# Patient Record
Sex: FEMALE | Race: WHITE | NOT HISPANIC OR LATINO | Employment: FULL TIME | URBAN - METROPOLITAN AREA
[De-identification: names, ages, dates, MRNs, and addresses within clinical notes are randomized per-mention and may not be internally consistent; named-entity substitution may affect disease eponyms.]

---

## 2017-02-24 DIAGNOSIS — N94.6 DYSMENORRHEA: ICD-10-CM

## 2017-02-24 NOTE — LETTER
[facility]                                             [address] [place]  [address]    February 24, 2017    Jil Renteria  [address] [facility] [place] [address]    Dear Jil,       We recently received a refill request for norgestrel-ethinyl estradiol (LO/OVRAL) 0.3-30 MG-MCG per tablet.  We have refilled this for one time only because you are due for a:    Physical office visit      Please call at your earliest convenience so that there will not be a delay with your future refills.          Thank you,   Your [facility] Care Team/  [phone]

## 2017-02-24 NOTE — TELEPHONE ENCOUNTER
Rx refilled per [facility] RN refill protocol.    TC, patient due for:  AFE and   Health Maintenance Due   Topic Date Due     EYE EXAM Q1 YEAR( NO INBASKET)  11/08/2012     HPV IMMUNIZATION (2 of 3 - Female 3 Dose Series) 09/23/2013     INFLUENZA VACCINE (SYSTEM ASSIGNED)  09/01/2016     Jo Ann Henao RN

## 2017-09-02 DIAGNOSIS — N94.6 DYSMENORRHEA: ICD-10-CM

## 2017-09-02 NOTE — LETTER
September 5, 2017    Jil Renteria  [address] [address] [facility] [address]        Dear Jil,       We recently received a refill request for LOW-OGESTREL 0.3-30 MG-MCG per tablet.  We have refilled this for a one time 28 day supply only because you are overdue for a:    AFE (annual female exam) office visit      Please call at your earliest convenience so that there will not be a delay with your future refills.          Thank you,   Your [facility] Team/hitesh  [phone]

## 2017-09-05 RX ORDER — NORGESTREL AND ETHINYL ESTRADIOL 0.3-0.03MG
KIT ORAL
Qty: 28 TABLET | Refills: 0 | Status: SHIPPED | OUTPATIENT
Start: 2017-09-05 | End: 2017-09-15

## 2017-09-05 NOTE — TELEPHONE ENCOUNTER
Patient overdue for AFE.  Letter mailed to patient in Feb.     No appointment pending at this time.  Routing to provider to advise.    Jo Ann Henao RN

## 2017-09-15 ENCOUNTER — OFFICE VISIT (OUTPATIENT)
Dept: FAMILY MEDICINE | Facility: CLINIC | Age: 22
End: 2017-09-15
Payer: COMMERCIAL

## 2017-09-15 VITALS
DIASTOLIC BLOOD PRESSURE: 77 MMHG | TEMPERATURE: 96.9 F | WEIGHT: 106 LBS | HEIGHT: 62 IN | BODY MASS INDEX: 19.51 KG/M2 | OXYGEN SATURATION: 100 % | SYSTOLIC BLOOD PRESSURE: 120 MMHG | HEART RATE: 72 BPM

## 2017-09-15 DIAGNOSIS — N94.6 DYSMENORRHEA: ICD-10-CM

## 2017-09-15 DIAGNOSIS — Z23 NEED FOR PROPHYLACTIC VACCINATION AND INOCULATION AGAINST INFLUENZA: ICD-10-CM

## 2017-09-15 DIAGNOSIS — Z00.00 ENCOUNTER FOR ROUTINE ADULT HEALTH EXAMINATION WITHOUT ABNORMAL FINDINGS: Primary | ICD-10-CM

## 2017-09-15 DIAGNOSIS — Z11.3 SCREENING EXAMINATION FOR VENEREAL DISEASE: ICD-10-CM

## 2017-09-15 PROCEDURE — 99395 PREV VISIT EST AGE 18-39: CPT | Mod: 25 | Performed by: PHYSICIAN ASSISTANT

## 2017-09-15 PROCEDURE — 90471 IMMUNIZATION ADMIN: CPT | Performed by: PHYSICIAN ASSISTANT

## 2017-09-15 PROCEDURE — 90686 IIV4 VACC NO PRSV 0.5 ML IM: CPT | Performed by: PHYSICIAN ASSISTANT

## 2017-09-15 NOTE — NURSING NOTE
"Chief Complaint   Patient presents with     Physical       Initial /77  Pulse 72  Temp 96.9  F (36.1  C) (Oral)  Ht 5' 1.5\" (1.562 m)  Wt 106 lb (48.1 kg)  LMP 09/10/2017  SpO2 100%  BMI 19.7 kg/m2 Estimated body mass index is 19.7 kg/(m^2) as calculated from the following:    Height as of this encounter: 5' 1.5\" (1.562 m).    Weight as of this encounter: 106 lb (48.1 kg).  Medication Reconciliation: complete     Carmelita Gil cma      "

## 2017-09-15 NOTE — PROGRESS NOTES
Injectable Influenza Immunization Documentation    1.  Is the person to be vaccinated sick today? no    2. Does the person to be vaccinated have an allergy to a component   of the vaccine? no    3. Has the person to be vaccinated ever had a serious reaction   to influenza vaccine in the past? no    4. Has the person to be vaccinated ever had Guillain-Barré syndrome?     Form completed by Carmelita Gil [facility]

## 2017-09-15 NOTE — MR AVS SNAPSHOT
After Visit Summary   9/15/2017    Jil Renteria    MRN: [id number]           Patient Information     Date Of Birth          1995        Visit Information        Provider Department      9/15/2017 2:00 PM Essence Rosario PA-C [facility]        Today's Diagnoses     Screening examination for venereal disease    -  1    Dysmenorrhea          Care Instructions      Preventive Health Recommendations  Female Ages 18 to 25     Yearly exam:     See your health care provider every year in order to  o Review health changes.   o Discuss preventive care.    o Review your medicines if your doctor has prescribed any.      You should be tested each year for STDs (sexually transmitted diseases).       After age 20, talk to your provider about how often you should have cholesterol testing.      Starting at age 21, get a Pap test every three years. If you have an abnormal result, your doctor may have you test more often.      If you are at risk for diabetes, you should have a diabetes test (fasting glucose).     Shots:     Get a flu shot each year.     Get a tetanus shot every 10 years.     Consider getting the shot (vaccine) that prevents cervical cancer (Gardasil).    Nutrition:     Eat at least 5 servings of fruits and vegetables each day.    Eat whole-grain bread, whole-wheat pasta and brown rice instead of white grains and rice.    Talk to your provider about Calcium and Vitamin D.     Lifestyle    Exercise at least 150 minutes a week each week (30 minutes a day, 5 days a week). This will help you control your weight and prevent disease.    Limit alcohol to one drink per day.    No smoking.     Wear sunscreen to prevent skin cancer.    See your dentist every six months for an exam and cleaning.  Preventive Health Recommendations  Female Ages 18 to 25     Yearly exam:   See your health care provider every year in order to  Review health changes.   Discuss preventive care.    Review your  medicines if your doctor has prescribed any.    You should be tested each year for STDs (sexually transmitted diseases).     After age 20, talk to your provider about how often you should have cholesterol testing.    Starting at age 21, get a Pap test every three years. If you have an abnormal result, your doctor may have you test more often.    If you are at risk for diabetes, you should have a diabetes test (fasting glucose).     Shots:   Get a flu shot each year.   Get a tetanus shot every 10 years.   Consider getting the shot (vaccine) that prevents cervical cancer (Gardasil).    Nutrition:   Eat at least 5 servings of fruits and vegetables each day.  Eat whole-grain bread, whole-wheat pasta and brown rice instead of white grains and rice.  Talk to your provider about Calcium and Vitamin D.     Lifestyle  Exercise at least 150 minutes a week each week (30 minutes a day, 5 days a week). This will help you control your weight and prevent disease.  Limit alcohol to one drink per day.  No smoking.   Wear sunscreen to prevent skin cancer.  See your dentist every six months for an exam and cleaning.          Follow-ups after your visit        Future tests that were ordered for you today     Open Future Orders        Priority Expected Expires Ordered    Chlamydia trachomatis PCR Routine  9/15/2018 9/15/2017            Who to contact     If you have questions or need follow up information about today's clinic visit or your schedule please contact [facility] directly at [phone].  Normal or non-critical lab and imaging results will be communicated to you by MyChart, letter or phone within 4 business days after the clinic has received the results. If you do not hear from us within 7 days, please contact the clinic through MyChart or phone. If you have a critical or abnormal lab result, we will notify you by phone as soon as possible.  Submit refill requests through [employer] or call your pharmacy and they  "will forward the refill request to us. Please allow 3 business days for your refill to be completed.          Additional Information About Your Visit        [employer] Information     [employer] gives you secure access to your electronic health record. If you see a primary care provider, you can also send messages to your care team and make appointments. If you have questions, please call your primary care clinic.  If you do not have a primary care provider, please call [phone] and they will assist you.        Care EveryWhere ID     This is your Care EveryWhere ID. This could be used by other organizations to access your [place] medical records  [id number]        Your Vitals Were     Pulse Temperature Height Last Period Pulse Oximetry BMI (Body Mass Index)    72 96.9  F (36.1  C) (Oral) 5' 1.5\" (1.562 m) 09/10/2017 100% 19.7 kg/m2       Blood Pressure from Last 3 Encounters:   09/15/17 120/77   10/30/16 119/67   03/18/16 112/69    Weight from Last 3 Encounters:   09/15/17 106 lb (48.1 kg)   10/30/16 106 lb 12.8 oz (48.4 kg) (11 %)*   03/18/16 107 lb (48.5 kg) (12 %)*     * Growth percentiles are based on CDC 2-20 Years data.                 Today's Medication Changes          These changes are accurate as of: 9/15/17  2:17 PM.  If you have any questions, ask your nurse or doctor.               These medicines have changed or have updated prescriptions.        Dose/Directions    norgestrel-ethinyl estradiol 0.3-30 MG-MCG per tablet   Commonly known as:  LOW-OGESTREL   This may have changed:  See the new instructions.   Used for:  Dysmenorrhea   Changed by:  Essence Rosario PA-C        Dose:  1 tablet   Take 1 tablet by mouth daily   Quantity:  28 tablet   Refills:  11         Stop taking these medicines if you haven't already. Please contact your care team if you have questions.     clindamycin 1 % topical gel   Commonly known as:  CLINDAMAX   Stopped by:  Essence Rosario PA-C                Where " to get your medicines      These medications were sent to [facility] - [place], [place] - [address] AT [facility] [address] [facility]  [address] [address]     Phone:  [phone]     norgestrel-ethinyl estradiol 0.3-30 MG-MCG per tablet                Primary Care Provider Office Phone # Fax #    [facility] [phone] [address] [facility] [place] [address]        Equal Access to Services     [facility] Hospital[facility]ANIYA : Hadii aad ku hadasho Soomaali, waaxda luqadaha, qaybta kaalmada adeegyada, waxdinorah rendon . So [facility] [phone].    ATENCIÓN: Si habla español, tiene a connolly disposición servicios gratuitos de asistencia lingüística. [facility] [phone].    We comply with applicable federal civil rights laws and [place] laws. We do not discriminate on the basis of race, color, national origin, age, disability sex, sexual orientation or gender identity.            Thank you!     Thank you for choosing [facility]  for your care. Our goal is always to provide you with excellent care. Hearing back from our patients is one way we can continue to improve our services. Please take a few minutes to complete the written survey that you may receive in the mail after your visit with us. Thank you!             Your Updated Medication List - Protect others around you: Learn how to safely use, store and throw away your medicines at www.disposemymeds.org.          This list is accurate as of: 9/15/17  2:17 PM.  Always use your most recent med list.                   Brand Name Dispense Instructions for use Diagnosis    ALLEGRA PO           norgestrel-ethinyl estradiol 0.3-30 MG-MCG per tablet    LOW-OGESTREL    28 tablet    Take 1 tablet by mouth daily    Dysmenorrhea

## 2017-09-15 NOTE — PROGRESS NOTES
SUBJECTIVE:   CC: Jil Renteria is an 22 year old woman who presents for preventive health visit.     Healthy Habits:    Do you get at least three servings of calcium containing foods daily (dairy, green leafy vegetables, etc.)? yes    Amount of exercise or daily activities, outside of work: walking around campus    Problems taking medications regularly No    Medication side effects: No    Have you had an eye exam in the past two years? yes    Do you see a dentist twice per year? yes    Do you have sleep apnea, excessive snoring or daytime drowsiness?no      She is requesting a refill of her birth control.  She would like her flu shot today.      Today's PHQ-2 Score:   PHQ-2 ( 1999 Pfizer) 8/10/2015 3/30/2015   Q1: Little interest or pleasure in doing things 0 0   Q2: Feeling down, depressed or hopeless 0 0   PHQ-2 Score 0 0       Abuse: Current or Past(Physical, Sexual or Emotional)- No  Do you feel safe in your environment - Yes    Social History   Substance Use Topics     Smoking status: Never Smoker     Smokeless tobacco: Never Used      Comment: lives in smoke free household     Alcohol use No     The patient does not drink >3 drinks per day nor >7 drinks per week.    Reviewed orders with patient.  Reviewed health maintenance and updated orders accordingly - Yes  BP Readings from Last 3 Encounters:   09/15/17 120/77   10/30/16 119/67   03/18/16 112/69    Wt Readings from Last 3 Encounters:   09/15/17 106 lb (48.1 kg)   10/30/16 106 lb 12.8 oz (48.4 kg) (11 %)*   03/18/16 107 lb (48.5 kg) (12 %)*     * Growth percentiles are based on CDC 2-20 Years data.                  Patient Active Problem List   Diagnosis     Acne     Left hip pain     Acne vulgaris     Dysmenorrhea     Encounter for contraceptive management, unspecified encounter     History reviewed. No pertinent surgical history.    Social History   Substance Use Topics     Smoking status: Never Smoker     Smokeless tobacco: Never Used      Comment:  "lives in smoke free household     Alcohol use No     Family History   Problem Relation Age of Onset     Hypertension Maternal Grandmother      CANCER Maternal Grandfather      DIABETES No family hx of      CEREBROVASCULAR DISEASE No family hx of      Thyroid Disease No family hx of      Glaucoma No family hx of      Macular Degeneration No family hx of          Current Outpatient Prescriptions   Medication Sig Dispense Refill     LOW-OGESTREL 0.3-30 MG-MCG per tablet TAKE 1 TABLET BY MOUTH DAILY 28 tablet 0     Fexofenadine HCl (ALLEGRA PO)        Allergies   Allergen Reactions     Amoxicillin Hives           Mammogram not appropriate for this patient based on age.    Pertinent mammograms are reviewed under the imaging tab.  History of abnormal Pap smear: NO - under age 21, PAP not appropriate for age    Reviewed and updated as needed this visit by clinical staff         Reviewed and updated as needed this visit by Provider              ROS:  C: NEGATIVE for fever, chills, change in weight  I: NEGATIVE for worrisome rashes, moles or lesions  E: NEGATIVE for vision changes or irritation  ENT: NEGATIVE for ear, mouth and throat problems  R: NEGATIVE for significant cough or SOB  B: NEGATIVE for masses, tenderness or discharge  CV: NEGATIVE for chest pain, palpitations or peripheral edema  GI: NEGATIVE for nausea, abdominal pain, heartburn, or change in bowel habits  : NEGATIVE for unusual urinary or vaginal symptoms. Periods are regular.  M: NEGATIVE for significant arthralgias or myalgia  N: NEGATIVE for weakness, dizziness or paresthesias  P: NEGATIVE for changes in mood or affect    OBJECTIVE:   /77  Pulse 72  Temp 96.9  F (36.1  C) (Oral)  Ht 5' 1.5\" (1.562 m)  Wt 106 lb (48.1 kg)  LMP 09/10/2017  SpO2 100%  BMI 19.7 kg/m2  EXAM:  GENERAL: healthy, alert and no distress  EYES: Eyes grossly normal to inspection, PERRL and conjunctivae and sclerae normal  HENT: ear canals and TM's normal, nose and " "mouth without ulcers or lesions  NECK: no adenopathy, no asymmetry, masses, or scars and thyroid normal to palpation  RESP: lungs clear to auscultation - no rales, rhonchi or wheezes  BREAST: deferred  CV: regular rate and rhythm, normal S1 S2, no S3 or S4, no murmur, click or rub, no peripheral edema and peripheral pulses strong  ABDOMEN: soft, nontender, no hepatosplenomegaly, no masses and bowel sounds normal   (female): deferred  MS: no gross musculoskeletal defects noted, no edema  SKIN: no suspicious lesions or rashes  NEURO: Normal strength and tone, mentation intact and speech normal  PSYCH: mentation appears normal, affect normal/bright    ASSESSMENT/PLAN:       ICD-10-CM    1. Encounter for routine adult health examination without abnormal findings Z00.00    2. Dysmenorrhea N94.6 norgestrel-ethinyl estradiol (LOW-OGESTREL) 0.3-30 MG-MCG per tablet   3. Screening examination for venereal disease Z11.3 Chlamydia trachomatis PCR   4. Need for prophylactic vaccination and inoculation against influenza Z23 FLU VAC, SPLIT VIRUS IM > 3 YO (QUADRIVALENT) [92095]     Vaccine Administration, Initial [38501]       COUNSELING:   Reviewed preventive health counseling, as reflected in patient instructions       Regular exercise       Healthy diet/nutrition       Immunizations    Vaccinated for: Influenza               reports that she has never smoked. She has never used smokeless tobacco.    Estimated body mass index is 19.53 kg/(m^2) as calculated from the following:    Height as of 3/18/16: 5' 2\" (1.575 m).    Weight as of 10/30/16: 106 lb 12.8 oz (48.4 kg).         Counseling Resources:  ATP IV Guidelines  Pooled Cohorts Equation Calculator  Breast Cancer Risk Calculator  FRAX Risk Assessment  ICSI Preventive Guidelines  Dietary Guidelines for Americans, 2010  USDA's MyPlate  ASA Prophylaxis  Lung CA Screening    Essence Rosario PA-C  [facility]  "

## 2017-11-11 ENCOUNTER — NURSE TRIAGE (OUTPATIENT)
Dept: NURSING | Facility: CLINIC | Age: 22
End: 2017-11-11

## 2017-11-11 ENCOUNTER — TELEPHONE (OUTPATIENT)
Dept: FAMILY MEDICINE | Facility: CLINIC | Age: 22
End: 2017-11-11

## 2017-11-11 DIAGNOSIS — N94.6 DYSMENORRHEA: ICD-10-CM

## 2017-11-11 NOTE — TELEPHONE ENCOUNTER
International travel and study abroad. Leaves January 1st. Needs to discuss options on birth control pill to get enough ahead for time to be overseas for 5 months, until the need of May 2018.  Please call her mom on Monday and can give her details since Jil will be in classes all day. I also advised that they contact her insurance company and ask them about what they require from people traveling over seas or study abroad. That they most likely have a policy in place on that.  Alena Diaz RN-BC  [facility]

## 2017-11-11 NOTE — TELEPHONE ENCOUNTER
International travel and study abroad. Leaves January 1st. Needs to discuss options on birth control pill to get enough ahead for time to be overseas for 5 months, until the need of May 2018.  Please call her mom on Monday and can give her details since Jil will be in classes all day. I also advised that they contact her insurance company and ask them about what they require from people traveling over seas or study abroad. That they most likely have a policy in place on that.  Alena Diaz RN-BC  [facility]

## 2017-11-13 NOTE — TELEPHONE ENCOUNTER
Per mother, Nessa:  Team Will need to send request electronically for a prior authorization for her birthcontrol to: [employer] [place].  Needs to state leaving January 1, 2018 for [place] and returning May 28, 2017.  Requesting 6 months of her birthcontrol at one time to take with her.  Mom states will call back on Thursday to see if [place] has been approved.  :;  Can you please start this process?   Yanni Mendoza RN

## 2017-11-13 NOTE — TELEPHONE ENCOUNTER
Spoke to [facility], they state in order to get 6 months, patient would have to call [phone]    Will have to order 90 days via mail order, then request a refill too soon for vacation supply to get the 6 months that she needs to go out of the country.    Spoke to patient's mom to let her know this is the process.    Dianne Williamson,

## 2017-11-13 NOTE — TELEPHONE ENCOUNTER
Patient is going to [place] for 5 months and needing to get her birth control to take with her.  Mom states doesn't trust the mail so mailing not an option;  Family not going to see her.  She will contact her health insurance to see what can be done in these situations.  Patient's prescription is refillable through 8/2018 so mom will call and let me know what she needs done.  Yanni Mendoza RN

## 2018-05-20 ENCOUNTER — HEALTH MAINTENANCE LETTER (OUTPATIENT)
Age: 23
End: 2018-05-20

## 2018-07-02 ENCOUNTER — HEALTH MAINTENANCE LETTER (OUTPATIENT)
Age: 23
End: 2018-07-02

## 2018-07-23 ENCOUNTER — HEALTH MAINTENANCE LETTER (OUTPATIENT)
Age: 23
End: 2018-07-23

## 2018-09-26 DIAGNOSIS — N94.6 DYSMENORRHEA: ICD-10-CM

## 2018-09-26 RX ORDER — NORGESTREL AND ETHINYL ESTRADIOL 0.3-0.03MG
KIT ORAL
Qty: 28 TABLET | Refills: 0 | Status: SHIPPED | OUTPATIENT
Start: 2018-09-26

## 2018-09-26 NOTE — LETTER
September 26, 2018    Jil Renteria  [address] [address]    Dear Jil,       We recently received a refill request for LOW-OGESTREL 0.3-30 MG-MCG per tablet.  We have refilled this for one time only because you are due for a:    Physical office visit      Please call at your earliest convenience so that there will not be a delay with your future refills.          Thank you,   Your [facility] Team/  [phone]

## 2018-09-26 NOTE — TELEPHONE ENCOUNTER
Medication is being filled for 1 time refill only due to:  pt needs AFE         - please send letter  Yazmin GARCIAN, RN, CPN

## 2019-11-08 ENCOUNTER — HEALTH MAINTENANCE LETTER (OUTPATIENT)
Age: 24
End: 2019-11-08

## 2020-02-23 ENCOUNTER — HEALTH MAINTENANCE LETTER (OUTPATIENT)
Age: 25
End: 2020-02-23

## 2020-12-06 ENCOUNTER — HEALTH MAINTENANCE LETTER (OUTPATIENT)
Age: 25
End: 2020-12-06

## 2021-01-20 ENCOUNTER — IMMUNIZATION (OUTPATIENT)
Dept: PEDIATRICS | Facility: CLINIC | Age: 26
End: 2021-01-20
Payer: COMMERCIAL

## 2021-01-20 PROCEDURE — 91300 PR COVID VAC PFIZER DIL RECON 30 MCG/0.3 ML IM: CPT

## 2021-01-20 PROCEDURE — 0001A PR COVID VAC PFIZER DIL RECON 30 MCG/0.3 ML IM: CPT

## 2021-02-10 ENCOUNTER — IMMUNIZATION (OUTPATIENT)
Dept: PEDIATRICS | Facility: CLINIC | Age: 26
End: 2021-02-10
Attending: INTERNAL MEDICINE
Payer: COMMERCIAL

## 2021-02-10 PROCEDURE — 91300 PR COVID VAC PFIZER DIL RECON 30 MCG/0.3 ML IM: CPT

## 2021-02-10 PROCEDURE — 0002A PR COVID VAC PFIZER DIL RECON 30 MCG/0.3 ML IM: CPT

## 2021-09-26 ENCOUNTER — HEALTH MAINTENANCE LETTER (OUTPATIENT)
Age: 26
End: 2021-09-26

## 2022-01-15 ENCOUNTER — HEALTH MAINTENANCE LETTER (OUTPATIENT)
Age: 27
End: 2022-01-15

## 2023-04-22 ENCOUNTER — HEALTH MAINTENANCE LETTER (OUTPATIENT)
Age: 28
End: 2023-04-22

## 2023-12-28 VITALS
DIASTOLIC BLOOD PRESSURE: 72 MMHG | SYSTOLIC BLOOD PRESSURE: 135 MMHG | TEMPERATURE: 98.6 F | HEART RATE: 97 BPM | OXYGEN SATURATION: 99 % | RESPIRATION RATE: 16 BRPM

## 2023-12-28 PROCEDURE — 99283 EMERGENCY DEPT VISIT LOW MDM: CPT

## 2023-12-29 ENCOUNTER — HOSPITAL ENCOUNTER (EMERGENCY)
Facility: CLINIC | Age: 28
Discharge: HOME | End: 2023-12-29
Attending: STUDENT IN AN ORGANIZED HEALTH CARE EDUCATION/TRAINING PROGRAM | Admitting: STUDENT IN AN ORGANIZED HEALTH CARE EDUCATION/TRAINING PROGRAM
Payer: COMMERCIAL

## 2023-12-29 DIAGNOSIS — T22.20XA SUPERFICIAL PARTIAL THICKNESS BURN OF UPPER EXTREMITY: ICD-10-CM

## 2023-12-29 NOTE — ED PROVIDER NOTES
History     Chief Complaint:  Burn       HPI   Jil Renteria is a 28 year old female, tetanus up-to-date, presents with concerns of a burn to the left forearm.  Patient was cooking with an oven when she burned the dorsal aspect of her left forearm.  She applied water right away which helped.  She was concerned that it looked purpleish opposed to reddish initially.  No other injuries.  Her pain currently is mild.      Independent Historian:    none    Review of External Notes:  none    Allergies:  Amoxicillin     Physical Exam   Patient Vitals for the past 24 hrs:   BP Temp Temp src Pulse Resp SpO2   12/28/23 2311 135/72 98.6  F (37  C) Temporal 97 16 99 %        Physical Exam  GENERAL: Patient well-appearing  HEAD: Atraumatic  EYES: Anicteric  NECK:  No rigidity  PULM: Speaking in full sentences without difficulty. No evidence of respiratory distress.  DERM: 3 cm x 1 cm well-circumscribed superficial partial-thickness burn with small amount of scaling skin that is sensate over the left dorsal forearm.  No crepitus or bullae.  Not cellulitic.  EXTREMITY: Moving all extremities without difficulty.       Emergency Department Course            Laboratory: Imaging:   Labs Ordered and Resulted from Time of ED Arrival to Time of ED Departure - No data to display  No orders to display              Emergency Department Course & Assessments:             Interventions:  Medications - No data to display     Assessments, Independent Interpretation, Consult/Discussion of ManagementTests:       Social Determinants of Health affecting care:  None    Disposition:  The patient was discharged to home.     Impression & Plan         Medical Decision Making:  Symptoms most consistent with superficial partial-thickness burn to the left forearm.  Differential diagnosis-considered full-thickness burn, deep partial-thickness burn.  Patient is sensate over the area.  It is a very small area of skin.  Does not appear infected.  Patient  has already applied cool water.  Applied bacitracin here.  I recommend she apply aloe vera multiple times per day and to cover the wound.  Patient was evaluated for acute medical emergencies. Based on my clinical assessment, I do not think any further acute management or work-up is required.  Patient stable for discharge. Given strict return precautions. All questions answered. Patient content with plan. Recommended PCP follow-up in 2-3 days.    Diagnosis:    ICD-10-CM    1. Superficial partial thickness burn of upper extremity  T22.20XA            Discharge Medications:  New Prescriptions    No medications on file          12/29/2023   Mor Saini MD Foss, Kevin, MD  12/29/23 0031

## 2023-12-29 NOTE — ED TRIAGE NOTES
Patient presents with 5 cm x 2 cm burn to left forearm. She was attempting to take a dish out of her oven and her arm hit the top of the oven.     Triage Assessment (Adult)       Row Name 12/28/23 9107          Triage Assessment    Airway WDL WDL        Respiratory WDL    Respiratory WDL WDL        Skin Circulation/Temperature WDL    Skin Circulation/Temperature WDL WDL        Cardiac WDL    Cardiac WDL WDL        Peripheral/Neurovascular WDL    Peripheral Neurovascular WDL WDL        Cognitive/Neuro/Behavioral WDL    Cognitive/Neuro/Behavioral WDL WDL

## 2023-12-29 NOTE — DISCHARGE INSTRUCTIONS
Return to the emergency department if symptoms are worsening, become concerning, or for any other concerns. Follow-up with your doctor in 2-3 if needed.

## 2024-09-07 ENCOUNTER — HEALTH MAINTENANCE LETTER (OUTPATIENT)
Age: 29
End: 2024-09-07